# Patient Record
Sex: FEMALE | Race: WHITE | NOT HISPANIC OR LATINO | URBAN - METROPOLITAN AREA
[De-identification: names, ages, dates, MRNs, and addresses within clinical notes are randomized per-mention and may not be internally consistent; named-entity substitution may affect disease eponyms.]

---

## 2018-10-25 ENCOUNTER — TELEPHONE (OUTPATIENT)
Dept: FAMILY MEDICINE | Facility: OTHER | Age: 33
End: 2018-10-25

## 2018-10-25 NOTE — TELEPHONE ENCOUNTER
Jenni Islas is a 32 year old female who presents with back pain.    NURSING ASSESSMENT:  Description:  Mid to lower left back pain that is wrapping to the left lower abdomin. Back pain constant 6/10, and when walking wraps to stomach. Has a dry irritation sensation in the throat with cough. Was sick about 2 weeks ago with a virus, with fever present and since has resolved. Lower back pain was initially 10/10 in bed this morning at 3am, with positional changes and rest had decreased to 6/10. Denies fevers, bending/twisting/lifting, injury, urination pain/concerns.  Vitals: Blood pressure: 127/88 Pulse: 90  Onset/duration:  This morning around 3am  Precip. factors:  Unknown   Associated symptoms:  Left mid to lower back pain that wraps to lower left abdomin  Improves/worsens symptoms:  Improving   Pain scale (0-10)   6/10  LMP/preg/breast feeding:  N/A  Last exam/Treatment:  NEW   Allergies: Allergies not on file    NURSING PLAN: Nursing advice to patient discussed home care measures till her scheduled OV at her PCPs office at noon today, discussed alternative OV options    RECOMMENDED DISPOSITION:  See in 24 hours   Will comply with recommendation: Yes  If further questions/concerns or if symptoms do not improve, worsen or new symptoms develop, call your PCP or [facility] as soon as possible.    NOTES:  Disposition was determined by the first positive assessment question, therefore all previous assessment questions were negative    Guideline used:  Telephone Triage Protocols for Nurses, Fifth Edition, Maranda Avila  Back pain  Nursing Judgment    Pili Pacheco RN, BSN